# Patient Record
Sex: MALE | HISPANIC OR LATINO | Employment: FULL TIME | URBAN - METROPOLITAN AREA
[De-identification: names, ages, dates, MRNs, and addresses within clinical notes are randomized per-mention and may not be internally consistent; named-entity substitution may affect disease eponyms.]

---

## 2017-09-05 ENCOUNTER — APPOINTMENT (OUTPATIENT)
Dept: RADIOLOGY | Facility: MEDICAL CENTER | Age: 32
End: 2017-09-05
Attending: EMERGENCY MEDICINE
Payer: MEDICAID

## 2017-09-05 ENCOUNTER — HOSPITAL ENCOUNTER (EMERGENCY)
Facility: MEDICAL CENTER | Age: 32
End: 2017-09-05
Attending: EMERGENCY MEDICINE
Payer: MEDICAID

## 2017-09-05 VITALS
SYSTOLIC BLOOD PRESSURE: 120 MMHG | OXYGEN SATURATION: 98 % | HEART RATE: 71 BPM | HEIGHT: 64 IN | BODY MASS INDEX: 32.97 KG/M2 | WEIGHT: 193.12 LBS | DIASTOLIC BLOOD PRESSURE: 74 MMHG | RESPIRATION RATE: 16 BRPM | TEMPERATURE: 98.6 F

## 2017-09-05 DIAGNOSIS — S62.91XA HAND FRACTURE, RIGHT, CLOSED, INITIAL ENCOUNTER: ICD-10-CM

## 2017-09-05 DIAGNOSIS — F41.8 SITUATIONAL ANXIETY: ICD-10-CM

## 2017-09-05 PROCEDURE — 99284 EMERGENCY DEPT VISIT MOD MDM: CPT

## 2017-09-05 PROCEDURE — 302874 HCHG BANDAGE ACE 2 OR 3""

## 2017-09-05 PROCEDURE — 29125 APPL SHORT ARM SPLINT STATIC: CPT

## 2017-09-05 PROCEDURE — 73130 X-RAY EXAM OF HAND: CPT | Mod: RT

## 2017-09-05 RX ORDER — SODIUM CHLORIDE 9 MG/ML
30 INJECTION, SOLUTION INTRAVENOUS ONCE
Status: DISCONTINUED | OUTPATIENT
Start: 2017-09-05 | End: 2017-09-05

## 2017-09-05 RX ORDER — HYDROCODONE BITARTRATE AND ACETAMINOPHEN 5; 325 MG/1; MG/1
1-2 TABLET ORAL EVERY 6 HOURS PRN
Qty: 12 TAB | Refills: 0 | Status: SHIPPED | OUTPATIENT
Start: 2017-09-05

## 2017-09-05 ASSESSMENT — PAIN SCALES - GENERAL: PAINLEVEL_OUTOF10: 8

## 2019-12-24 ENCOUNTER — HOSPITAL ENCOUNTER (EMERGENCY)
Facility: MEDICAL CENTER | Age: 34
End: 2019-12-24
Attending: EMERGENCY MEDICINE
Payer: MEDICAID

## 2019-12-24 ENCOUNTER — APPOINTMENT (OUTPATIENT)
Dept: RADIOLOGY | Facility: MEDICAL CENTER | Age: 34
End: 2019-12-24
Attending: EMERGENCY MEDICINE
Payer: MEDICAID

## 2019-12-24 VITALS
DIASTOLIC BLOOD PRESSURE: 77 MMHG | TEMPERATURE: 99.5 F | HEART RATE: 77 BPM | HEIGHT: 64 IN | WEIGHT: 152.12 LBS | BODY MASS INDEX: 25.97 KG/M2 | SYSTOLIC BLOOD PRESSURE: 123 MMHG | OXYGEN SATURATION: 98 % | RESPIRATION RATE: 18 BRPM

## 2019-12-24 DIAGNOSIS — R07.89 COSTOCHONDRAL CHEST PAIN: ICD-10-CM

## 2019-12-24 DIAGNOSIS — F15.10 METHAMPHETAMINE ABUSE (HCC): ICD-10-CM

## 2019-12-24 LAB
ALBUMIN SERPL BCP-MCNC: 4.2 G/DL (ref 3.2–4.9)
ALBUMIN/GLOB SERPL: 1.6 G/DL
ALP SERPL-CCNC: 59 U/L (ref 30–99)
ALT SERPL-CCNC: 19 U/L (ref 2–50)
ANION GAP SERPL CALC-SCNC: 9 MMOL/L (ref 0–11.9)
AST SERPL-CCNC: 10 U/L (ref 12–45)
BASOPHILS # BLD AUTO: 0.7 % (ref 0–1.8)
BASOPHILS # BLD: 0.05 K/UL (ref 0–0.12)
BILIRUB SERPL-MCNC: 0.3 MG/DL (ref 0.1–1.5)
BUN SERPL-MCNC: 14 MG/DL (ref 8–22)
CALCIUM SERPL-MCNC: 8.8 MG/DL (ref 8.5–10.5)
CHLORIDE SERPL-SCNC: 103 MMOL/L (ref 96–112)
CO2 SERPL-SCNC: 25 MMOL/L (ref 20–33)
CREAT SERPL-MCNC: 0.95 MG/DL (ref 0.5–1.4)
EKG IMPRESSION: NORMAL
EOSINOPHIL # BLD AUTO: 0.15 K/UL (ref 0–0.51)
EOSINOPHIL NFR BLD: 2.2 % (ref 0–6.9)
ERYTHROCYTE [DISTWIDTH] IN BLOOD BY AUTOMATED COUNT: 39.1 FL (ref 35.9–50)
GLOBULIN SER CALC-MCNC: 2.7 G/DL (ref 1.9–3.5)
GLUCOSE SERPL-MCNC: 126 MG/DL (ref 65–99)
HCT VFR BLD AUTO: 42.9 % (ref 42–52)
HGB BLD-MCNC: 14.6 G/DL (ref 14–18)
IMM GRANULOCYTES # BLD AUTO: 0.03 K/UL (ref 0–0.11)
IMM GRANULOCYTES NFR BLD AUTO: 0.4 % (ref 0–0.9)
LYMPHOCYTES # BLD AUTO: 2.08 K/UL (ref 1–4.8)
LYMPHOCYTES NFR BLD: 30.6 % (ref 22–41)
MCH RBC QN AUTO: 30.5 PG (ref 27–33)
MCHC RBC AUTO-ENTMCNC: 34 G/DL (ref 33.7–35.3)
MCV RBC AUTO: 89.7 FL (ref 81.4–97.8)
MONOCYTES # BLD AUTO: 0.69 K/UL (ref 0–0.85)
MONOCYTES NFR BLD AUTO: 10.2 % (ref 0–13.4)
NEUTROPHILS # BLD AUTO: 3.79 K/UL (ref 1.82–7.42)
NEUTROPHILS NFR BLD: 55.9 % (ref 44–72)
NRBC # BLD AUTO: 0 K/UL
NRBC BLD-RTO: 0 /100 WBC
PLATELET # BLD AUTO: 236 K/UL (ref 164–446)
PMV BLD AUTO: 9.9 FL (ref 9–12.9)
POTASSIUM SERPL-SCNC: 4.1 MMOL/L (ref 3.6–5.5)
PROT SERPL-MCNC: 6.9 G/DL (ref 6–8.2)
RBC # BLD AUTO: 4.78 M/UL (ref 4.7–6.1)
SODIUM SERPL-SCNC: 137 MMOL/L (ref 135–145)
TROPONIN T SERPL-MCNC: <6 NG/L (ref 6–19)
WBC # BLD AUTO: 6.8 K/UL (ref 4.8–10.8)

## 2019-12-24 PROCEDURE — 700111 HCHG RX REV CODE 636 W/ 250 OVERRIDE (IP): Performed by: EMERGENCY MEDICINE

## 2019-12-24 PROCEDURE — 93005 ELECTROCARDIOGRAM TRACING: CPT | Performed by: EMERGENCY MEDICINE

## 2019-12-24 PROCEDURE — 85025 COMPLETE CBC W/AUTO DIFF WBC: CPT

## 2019-12-24 PROCEDURE — 96375 TX/PRO/DX INJ NEW DRUG ADDON: CPT

## 2019-12-24 PROCEDURE — 80053 COMPREHEN METABOLIC PANEL: CPT

## 2019-12-24 PROCEDURE — 93005 ELECTROCARDIOGRAM TRACING: CPT

## 2019-12-24 PROCEDURE — 96374 THER/PROPH/DIAG INJ IV PUSH: CPT

## 2019-12-24 PROCEDURE — 71045 X-RAY EXAM CHEST 1 VIEW: CPT

## 2019-12-24 PROCEDURE — 99285 EMERGENCY DEPT VISIT HI MDM: CPT

## 2019-12-24 PROCEDURE — 84484 ASSAY OF TROPONIN QUANT: CPT

## 2019-12-24 RX ORDER — KETOROLAC TROMETHAMINE 30 MG/ML
30 INJECTION, SOLUTION INTRAMUSCULAR; INTRAVENOUS ONCE
Status: COMPLETED | OUTPATIENT
Start: 2019-12-24 | End: 2019-12-24

## 2019-12-24 RX ORDER — LORAZEPAM 2 MG/ML
0.5 INJECTION INTRAMUSCULAR ONCE
Status: COMPLETED | OUTPATIENT
Start: 2019-12-24 | End: 2019-12-24

## 2019-12-24 RX ADMIN — LORAZEPAM 0.5 MG: 2 INJECTION INTRAMUSCULAR; INTRAVENOUS at 08:18

## 2019-12-24 RX ADMIN — KETOROLAC TROMETHAMINE 30 MG: 30 INJECTION, SOLUTION INTRAMUSCULAR at 08:16

## 2019-12-24 ASSESSMENT — ENCOUNTER SYMPTOMS
SHORTNESS OF BREATH: 0
DIZZINESS: 0
NECK PAIN: 0
PALPITATIONS: 0
VOMITING: 0
TINGLING: 0
FEVER: 0
BACK PAIN: 0
HEADACHES: 0
ABDOMINAL PAIN: 0

## 2019-12-24 ASSESSMENT — LIFESTYLE VARIABLES
CONSUMPTION TOTAL: INCOMPLETE
HAVE YOU EVER FELT YOU SHOULD CUT DOWN ON YOUR DRINKING: NO
EVER HAD A DRINK FIRST THING IN THE MORNING TO STEADY YOUR NERVES TO GET RID OF A HANGOVER: NO
TOTAL SCORE: 0
TOTAL SCORE: 0
EVER FELT BAD OR GUILTY ABOUT YOUR DRINKING: NO
DO YOU DRINK ALCOHOL: NO
TOTAL SCORE: 0
HAVE PEOPLE ANNOYED YOU BY CRITICIZING YOUR DRINKING: NO

## 2019-12-24 NOTE — DISCHARGE PLANNING
MSW spoke to CPS supervisor Mary. Pt's son and grandmother have left bedside. MSW updated Mary and provided contact information for pt's son. Pt's grandmother's name is Zahida. Mary stated they will follow-up at the home of pt for check on pt's son. They will not be responding to the ER at this time. MSW updated Alert team Vijaya.

## 2019-12-24 NOTE — ED NOTES
"Patient endorses using \" a crap ton of meth about 4 hours ago and I feel like something is going to happen. Where's the doctor I need the doctor now\". Patient son is room about 14 years old. Social Work called to assist. Patient placed on monitor VSS   "

## 2019-12-24 NOTE — ED NOTES
Patient removed cardiac monitor, pulse and bp cuff and came out to nurses station. Patient reassure his VS are good, ekg is good. Patient escorted back to his room and rehooked up

## 2019-12-24 NOTE — DISCHARGE PLANNING
Alert Team:    Spoke with patient at bedside with family present. Provided patient with resource list for inpatient rehab and establishing with psychiatry. Patient reports hx of bipolar, not currently treated. Conferred with RN  Regarding SW note regarding CPS. Bedside RN aware.

## 2019-12-24 NOTE — ED PROVIDER NOTES
ED Provider Note    ED Provider Note    Primary care provider: Pcp Pt States None  Means of arrival: POV  History obtained from: Patient, mother  History limited by: None    CHIEF COMPLAINT  Chief Complaint   Patient presents with   • Chest Pain       HPI  Curly Leigh is a 34 y.o. male who presents to the Emergency Department with his mother and younger brother with a chief complaint of left-sided chest wall pain that started at midnight.  Patient's had this multiple times in the past.  He states that each time, it has been in the setting of using methamphetamines.  He does frequently use methamphetamines.  His mom also states that he is borderline schizo affective.  He struggled with methamphetamine use and abuse since he was a teenager.  He denies any shortness of breath.  No cough or congestion.  No fever.  He denies any shoulder jaw or neck pain.  He feels as though there is muscle spasms in his pectoralis muscle.  He has been tolerating a regular diet.  No nausea or vomiting.    REVIEW OF SYSTEMS  Review of Systems   Constitutional: Negative for fever.   HENT: Negative for congestion.    Respiratory: Negative for shortness of breath.    Cardiovascular: Positive for chest pain. Negative for palpitations and leg swelling.   Gastrointestinal: Negative for abdominal pain and vomiting.   Genitourinary: Negative for dysuria.   Musculoskeletal: Negative for back pain and neck pain.   Neurological: Negative for dizziness, tingling and headaches.   All other systems reviewed and are negative.      PAST MEDICAL HISTORY   has a past medical history of Bipolar affective (HCC) and Depression.    SURGICAL HISTORY  patient denies any surgical history    SOCIAL HISTORY  Social History     Tobacco Use   • Smoking status: Current Every Day Smoker     Packs/day: 0.50     Years: 14.00     Pack years: 7.00   • Smokeless tobacco: Never Used   Substance Use Topics   • Alcohol use: Yes     Comment: occasional    • Drug use:  "Yes     Comment: METH      Social History     Substance and Sexual Activity   Drug Use Yes    Comment: METH       FAMILY HISTORY  History reviewed. No pertinent family history.    CURRENT MEDICATIONS  Home Medications    **Home medications have not yet been reviewed for this encounter**         ALLERGIES  No Known Allergies    PHYSICAL EXAM  VITAL SIGNS: /77   Pulse 77   Temp 37.5 °C (99.5 °F)   Resp 18   Ht 1.626 m (5' 4\")   Wt 69 kg (152 lb 1.9 oz)   SpO2 98%   BMI 26.11 kg/m²   Vitals reviewed.  Constitutional: Patient is oriented to person, place, and time. Appears well-developed and well-nourished.  Mild distress.    Head: Normocephalic and atraumatic.   Ears: Normal external ears bilaterally.   Mouth/Throat: Oropharynx is clear and moist  Eyes: Conjunctivae are normal. Pupils are equal, round, and reactive to light.   Neck: Normal range of motion. Neck supple.  Cardiovascular: Normal rate, regular rhythm and normal heart sounds. Normal peripheral pulses.  Pulmonary/Chest: Effort normal and breath sounds normal. No respiratory distress, no wheezes, rhonchi, or rales. Left upper anterior chest wall tenderness, along the sternal costal margin.  Abdominal: Soft. Bowel sounds are normal. There is no tenderness. No rebound or guarding, or peritoneal signs. No CVA tenderness.  Musculoskeletal: No edema and no tenderness.   Lymphadenopathy: No cervical adenopathy.   Neurological: No focal deficits.   Skin: Skin is warm and dry. No erythema. No pallor.   Psychiatric: Patient has a normal mood and affect.     LABS  Results for orders placed or performed during the hospital encounter of 12/24/19   CBC with Differential   Result Value Ref Range    WBC 6.8 4.8 - 10.8 K/uL    RBC 4.78 4.70 - 6.10 M/uL    Hemoglobin 14.6 14.0 - 18.0 g/dL    Hematocrit 42.9 42.0 - 52.0 %    MCV 89.7 81.4 - 97.8 fL    MCH 30.5 27.0 - 33.0 pg    MCHC 34.0 33.7 - 35.3 g/dL    RDW 39.1 35.9 - 50.0 fL    Platelet Count 236 164 - 446 " K/uL    MPV 9.9 9.0 - 12.9 fL    Neutrophils-Polys 55.90 44.00 - 72.00 %    Lymphocytes 30.60 22.00 - 41.00 %    Monocytes 10.20 0.00 - 13.40 %    Eosinophils 2.20 0.00 - 6.90 %    Basophils 0.70 0.00 - 1.80 %    Immature Granulocytes 0.40 0.00 - 0.90 %    Nucleated RBC 0.00 /100 WBC    Neutrophils (Absolute) 3.79 1.82 - 7.42 K/uL    Lymphs (Absolute) 2.08 1.00 - 4.80 K/uL    Monos (Absolute) 0.69 0.00 - 0.85 K/uL    Eos (Absolute) 0.15 0.00 - 0.51 K/uL    Baso (Absolute) 0.05 0.00 - 0.12 K/uL    Immature Granulocytes (abs) 0.03 0.00 - 0.11 K/uL    NRBC (Absolute) 0.00 K/uL   Complete Metabolic Panel (CMP)   Result Value Ref Range    Sodium 137 135 - 145 mmol/L    Potassium 4.1 3.6 - 5.5 mmol/L    Chloride 103 96 - 112 mmol/L    Co2 25 20 - 33 mmol/L    Anion Gap 9.0 0.0 - 11.9    Glucose 126 (H) 65 - 99 mg/dL    Bun 14 8 - 22 mg/dL    Creatinine 0.95 0.50 - 1.40 mg/dL    Calcium 8.8 8.5 - 10.5 mg/dL    AST(SGOT) 10 (L) 12 - 45 U/L    ALT(SGPT) 19 2 - 50 U/L    Alkaline Phosphatase 59 30 - 99 U/L    Total Bilirubin 0.3 0.1 - 1.5 mg/dL    Albumin 4.2 3.2 - 4.9 g/dL    Total Protein 6.9 6.0 - 8.2 g/dL    Globulin 2.7 1.9 - 3.5 g/dL    A-G Ratio 1.6 g/dL   Troponin   Result Value Ref Range    Troponin T <6 6 - 19 ng/L   ESTIMATED GFR   Result Value Ref Range    GFR If African American >60 >60 mL/min/1.73 m 2    GFR If Non African American >60 >60 mL/min/1.73 m 2   EKG (NOW)   Result Value Ref Range    Report       [facility] Emergency Dept.    Test Date:  2019  Pt Name:    TAQUERIA CHOPRA         Department: ER  MRN:        [id number]                      Room:  Gender:     Male                         Technician: [id number]  :        1985                   Requested By:ER TRIAGE PROTOCOL  Order #:    [id number]                    Reading MD:    Measurements  Intervals                                Axis  Rate:       83                           P:          54  SD:         144                           QRS:        38  QRSD:       94                           T:          24  QT:         388  QTc:        456    Interpretive Statements  SINUS RHYTHM  RSR' IN V1 OR V2, RIGHT VCD OR RVH  No previous ECG available for comparison         All labs reviewed by me.    EKG Interpretation  Interpreted by me    Rhythm: normal sinus   Rate: 83  Axis: normal  Ectopy: none  Conduction: RSR prime in V1 and V2.  ST Segments: no acute change  T Waves: no acute change  Q Waves: none    Clinical Impression: no acute changes and normal EKG    RADIOLOGY  DX-CHEST-PORTABLE (1 VIEW)   Final Result      No acute cardiopulmonary abnormality.        The radiologist's interpretation of all radiological studies have been reviewed by me.    COURSE & MEDICAL DECISION MAKING  Pertinent Labs & Imaging studies reviewed. (See chart for details)    Obtained and reviewed past medical records.  Patient's last encounter was in 2017 in the emergency department he was seen for hand injury.    7:22 AM - Patient seen and examined at bedside.  This is a pleasant, albeit anxious, 34-year-old male who struggled for many years with methamphetamine abuse.  He presents with left-sided chest wall pain really.  This is a reproducible, localized pain.  Of explained to him, I doubt that it is ACS.  An IV has been established, labs drawn per nursing protocols.  And results show normal white blood cell count.  Normal H&H normal platelet count and no neutrophilic shift.  Chemistry shows an elevated glucose but was otherwise quite normal.  AST was slightly low at 10.  Troponin is less than 6.  This is after more than 6 hours of pain.  Awaiting chest x-ray.  Patient be treated with Toradol, low-dose of Ativan.    9:45 AM patient's reevaluated the bedside he is feeling markedly better.  We discussed lab results as well as chest x-ray findings, which were overall unrevealing.  He has a normal white blood cell count.  No neutrophilic shift.  Normal H&H and  platelet count.  Chemistry shows an elevated glucose of 126 and AST at low, 10 but otherwise normal with a normal troponin.  At this time, given that he has reassuring labs and a reassuring EKG after hours of pain, I suspect that this is musculoskeletal and likely costochondritis.  He is advised on taking ibuprofen for this.  He is requesting resources that he can explore in this community for rehab for methamphetamines.  I talked to Vijaya from the alert team who will come by and see him and provide him these resources.  After this, anticipate discharged home.  Patient is in stable condition and is feeling better.    Patient's discharged home in stable condition with strict return precautions.    FINAL IMPRESSION  1. Costochondral chest pain    2. Methamphetamine abuse (HCC)

## 2019-12-24 NOTE — DISCHARGE PLANNING
"Medical Social Work    Referral: Assessment/CPS Contact    Intervention: MSW received a call from bedside RN that pt admits to meth use and has his son with him.  MSW met with pt and pt's 14 year old son at bedside.  Pt would barely answer questions for this worker.  Pt's son, Moody Leigh is 14 years old (: 2005).  Pt states that he used meth 4 hours ago.  When asked how often he uses meth he states \"every second of the day\" but when questioned about use around his son he states \"it's not like that\".  Pt verified that his emergency contact is his wife, Tati Leigh and reports \"she's at home\".  Pt and pt's son are not very forthcoming with information when asked.  MSW contacted Karen with CPS to notify of the situation.  Karen states that she will review their records to see if they have history with the family and staff with her supervisor and call this worker back.    Plan: SW will follow.  "

## 2019-12-24 NOTE — ED TRIAGE NOTES
Chief Complaint   Patient presents with   • Chest Pain     Pt ambulatory to triage reporting chest pain since last night after using meth. Pt states pain is sharp but may be muscular. Pt appears anxious in triage. EKG done in triage.

## 2019-12-24 NOTE — DISCHARGE PLANNING
Medical Social Work    MSW received a return phone call from Karen with CPS who states that a worker will be by.  Kraen was updated that pt's mother (child's grandmother) is now at bedside.  ALEXANDRU gave report to ABHIJEET Brito.  CPS requested that pt not be D/C'd until they arrive to assess.  Bedside RN updated.

## 2019-12-24 NOTE — ED NOTES
PT amb up to triage desk stating 'where is the Dr. I need to see the Dr.' I explained the triage process, and asked the him to sit in the lobby in order for us to take him back to a . Pt then walked out the front lobby doors. Will monitor for a room.

## 2021-08-24 NOTE — ED NOTES
Report received from Adriana ALLISON pt care assumed. Pt has no needs at this time. VSS    Second referral received for History of adenomatous polyp of colon.

## 2023-07-02 ENCOUNTER — APPOINTMENT (OUTPATIENT)
Dept: RADIOLOGY | Facility: MEDICAL CENTER | Age: 38
End: 2023-07-02
Attending: EMERGENCY MEDICINE

## 2023-07-02 ENCOUNTER — HOSPITAL ENCOUNTER (EMERGENCY)
Facility: MEDICAL CENTER | Age: 38
End: 2023-07-02
Attending: EMERGENCY MEDICINE

## 2023-07-02 ENCOUNTER — HOSPITAL ENCOUNTER (EMERGENCY)
Dept: RADIOLOGY | Facility: MEDICAL CENTER | Age: 38
End: 2023-07-02
Attending: EMERGENCY MEDICINE

## 2023-07-02 VITALS
DIASTOLIC BLOOD PRESSURE: 84 MMHG | BODY MASS INDEX: 30.73 KG/M2 | SYSTOLIC BLOOD PRESSURE: 142 MMHG | WEIGHT: 180 LBS | RESPIRATION RATE: 16 BRPM | TEMPERATURE: 97.8 F | OXYGEN SATURATION: 97 % | HEIGHT: 64 IN | HEART RATE: 69 BPM

## 2023-07-02 DIAGNOSIS — M54.50 LUMBAR BACK PAIN: ICD-10-CM

## 2023-07-02 DIAGNOSIS — M54.6 ACUTE LEFT-SIDED THORACIC BACK PAIN: ICD-10-CM
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
## 2023-07-02 PROCEDURE — 72128 CT CHEST SPINE W/O DYE: CPT

## 2023-07-02 PROCEDURE — 72131 CT LUMBAR SPINE W/O DYE: CPT

## 2023-07-02 PROCEDURE — 700102 HCHG RX REV CODE 250 W/ 637 OVERRIDE(OP): Mod: UD | Performed by: EMERGENCY MEDICINE

## 2023-07-02 PROCEDURE — 99284 EMERGENCY DEPT VISIT MOD MDM: CPT

## 2023-07-02 PROCEDURE — A9270 NON-COVERED ITEM OR SERVICE: HCPCS | Mod: UD | Performed by: EMERGENCY MEDICINE

## 2023-07-02 RX ORDER — CYCLOBENZAPRINE HCL 5 MG
5-10 TABLET ORAL 3 TIMES DAILY PRN
Qty: 30 TABLET | Refills: 0 | Status: SHIPPED | OUTPATIENT
Start: 2023-07-02

## 2023-07-02 RX ORDER — OXYCODONE HYDROCHLORIDE AND ACETAMINOPHEN 5; 325 MG/1; MG/1
2 TABLET ORAL ONCE
Status: COMPLETED | OUTPATIENT
Start: 2023-07-02 | End: 2023-07-02

## 2023-07-02 RX ORDER — IBUPROFEN 600 MG/1
600 TABLET ORAL ONCE
Status: COMPLETED | OUTPATIENT
Start: 2023-07-02 | End: 2023-07-02

## 2023-07-02 RX ORDER — KETOROLAC TROMETHAMINE 30 MG/ML
15 INJECTION, SOLUTION INTRAMUSCULAR; INTRAVENOUS ONCE
Status: DISCONTINUED | OUTPATIENT
Start: 2023-07-02 | End: 2023-07-02 | Stop reason: HOSPADM

## 2023-07-02 RX ORDER — CYCLOBENZAPRINE HCL 10 MG
10 TABLET ORAL
Status: COMPLETED | OUTPATIENT
Start: 2023-07-02 | End: 2023-07-02

## 2023-07-02 RX ADMIN — IBUPROFEN 600 MG: 600 TABLET, FILM COATED ORAL at 10:11

## 2023-07-02 RX ADMIN — CYCLOBENZAPRINE 10 MG: 10 TABLET, FILM COATED ORAL at 10:11

## 2023-07-02 RX ADMIN — OXYCODONE HYDROCHLORIDE AND ACETAMINOPHEN 2 TABLET: 5; 325 TABLET ORAL at 11:27

## 2023-07-02 ASSESSMENT — PAIN DESCRIPTION - PAIN TYPE: TYPE: ACUTE PAIN

## 2023-07-02 NOTE — ED NOTES
Pt wheeled to B21.  Pt into gown, attached to monitor.  Girlfriend at bedside.  Pt A+Ox4, hypertensive on RA.  Call light within reach.  No acute distress at this time.

## 2023-07-02 NOTE — ED PROVIDER NOTES
"  ER Provider Note    Scribed for Javier Castro MD by Fallon Zamora. 7/2/2023  9:20 AM    Primary Care Provider: Pcp Pt States None    CHIEF COMPLAINT  Chief Complaint   Patient presents with    Fall     [facility], hit back of head +loc    Back Pain         HPI/ROS  LIMITATION TO HISTORY   Select: : None  OUTSIDE HISTORIAN(S):  Family who is present at bedside    Curly Leigh is a 38 y.o. male who presents to the ED complaining of a fall onset 2 days ago. Patient states when he fell he hit his head and lost consciousness for about 10 seconds. Denies any seizure-like of activity. Patient has associated midline back pain which has worsened over the last 2 days. Patient has associated weakness which is exacerbated when he stands up and lays flat. He describes the pain as \"stabbing\". He reports an associated headache, nausea, difficulty breathing. He denies any difficulty walking, bruising urinary incontinence, urinary retention, bowel incontinence, vomiting. Patient denies any medication.     PAST MEDICAL HISTORY  Past Medical History:   Diagnosis Date    Bipolar affective (HCC)     Depression      SURGICAL HISTORY  No pertinent past surgical history.    FAMILY HISTORY  No pertinent family history.    SOCIAL HISTORY   reports that he has been smoking. He has a 7.00 pack-year smoking history. He has never used smokeless tobacco. He reports current alcohol use. He reports current drug use.    CURRENT MEDICATIONS  Discharge Medication List as of 7/2/2023 11:17 AM        CONTINUE these medications which have NOT CHANGED    Details   hydrocodone-acetaminophen (NORCO) 5-325 MG Tab per tablet Take 1-2 Tabs by mouth every 6 hours as needed., Disp-12 Tab, R-0, Print Rx Paper           ALLERGIES  Patient has no known allergies.    PHYSICAL EXAM  BP (!) 140/98   Pulse (!) 102   Temp 37.4 °C (99.4 °F) (Temporal)   Resp 18   Ht 1.626 m (5' 4\")   Wt 81.6 kg (180 lb)   SpO2 96%   BMI 30.90 " kg/m²     Constitutional: Alert in no apparent distress.  HENT: No signs of trauma, Bilateral external ears normal, Nose normal. Uvula midline.   Eyes: Pupils are equal and reactive, Conjunctiva normal, Non-icteric.   Neck: Normal range of motion, No tenderness, Supple, No stridor.   Lymphatic: No lymphadenopathy noted.   Cardiovascular: Regular rate and rhythm, no murmurs.   Thorax & Lungs: Normal breath sounds, No respiratory distress, No wheezing, No chest tenderness.   Abdomen: Bowel sounds normal, Soft, No tenderness, No peritoneal signs, No masses, No pulsatile masses.   Skin: Warm, Dry, No erythema, No rash.   Back: No bony tenderness, No CVA tenderness.   Extremities: Intact distal pulses, No edema, No tenderness, No cyanosis.  Musculoskeletal: Good range of motion in all major joints. No tenderness to palpation or major deformities noted.   Neurologic: Alert , Normal motor function, Normal sensory function, No focal deficits noted.   Psychiatric: Affect normal, Judgment normal, Mood normal.      DIAGNOSTIC STUDIES  Radiology:   The attending emergency physician has independently interpreted the diagnostic imaging associated with this visit and am waiting the final reading from the radiologist.   Preliminary interpretation is a follows: no fx  Radiologist interpretation:   CT-LSPINE W/O PLUS RECONS   Final Result      No acute fracture or dislocation of the lumbar spine.      CT-TSPINE W/O PLUS RECONS   Final Result      No acute fracture or dislocation of the thoracic spine        COURSE & MEDICAL DECISION MAKING     ED Observation Status? No; Patient does not meet criteria for ED Observation.     INITIAL ASSESSMENT, COURSE AND PLAN  Care Narrative:   9:20 AM - Patient is a 38 year old male who presents to ED after a fall onset 2 days ago. Patient was first seen and evaluated at bedside.   I discussed with the patient the plan of care, which includes treating the patient with medication for their symptoms,  as well as obtaining lab work and imaging for further evaluation. Patient understands and verbalizes agreement to plan of care.   Patient will be treated with Motrin 600 mg, Toradol 15 mg, and Flexeril 10 mg. Ordered CT-Tspine w/o plus recons, CT-Lspine w/o plus recons and DX-Chest.   Differentials include but are not limited to:   No weakness or numbness to suggest cauda equina syndrome.  No IVDU/fever. No history of cancer/unintentional weight loss. No bowel/bladder dysfunction. No numbness/weakness/saddle anesthesia.  Doubt infectious etiology given no fever  Given trauma CT T-spine and L-spine were ordered  No evidence of fracture noted on CT scans  No e/o rash to suggest zoster  Pain likely represents MSK pain.  Pt given below meds for sx relief in ED  Pt agrees to f/u w/ PCP or physical therapist if pain persists for greater than 1 wk.  Pt instructed to return to ED if pain continues to persist or worsens.      11:09 AM - Patient denies getting DX-Chest performed at this time.     11:18 AM - Patient was reevaluated at bedside. Patient was medicated with Percocet 5-325 mg and feels alleviated. Discussed lab and radiology results with the patient and informed them of his reassuring results. I then informed the patient of my plan for discharge, which includes strict return precautions for any new or worsening symptoms. Patient understands and verbalizes agreement to plan of care. Patient is comfortable going home at this time.         I encourage patient to follow-up with primary care physician and to consider physical therapy and if symptoms do not improve MRI  Patient referred to Dr. Perez for further pain management  I encourage patient    HTN/IDDM FOLLOW UP:  The patient is referred to a primary physician for blood pressure management, diabetic screening, and for all other preventive health concerns    DISPOSITION AND DISCUSSIONS  I have discussed management of the patient with the following physicians and  AMANDA's:  None    Discussion of management with other QHP or appropriate source(s): None     Escalation of care considered, and ultimately not performed: acute inpatient care management, however at this time, the patient is most appropriate for outpatient management.    Barriers to care at this time, including but not limited to: Patient does not have established PCP.     Decision tools and prescription drugs considered including, but not limited to:  None .  Patient will be discharged home.    FOLLOW UP:  [facility], Emergency Dept  [address] [id number] [phone]    If symptoms worsen    [place] [address] [facility] [address]  [phone]  In 3 days  If symptoms worsen    Marietta Koehler M.D.  [address] [facility] [id number] [phone]        FINAL DIAGNOSIS  1. Fall, initial encounter    2. Acute left-sided thoracic back pain    3. Lumbar back pain      The note accurately reflects work and decisions made by me.  Javier Castro M.D.  7/2/2023  4:14 PM

## 2023-07-02 NOTE — ED TRIAGE NOTES
Chief Complaint   Patient presents with    Fall     Slipped and fell at [facility] yesterday, hit back of head +loc    Back Pain     Pt wheeled to triage with above complaints, pt slipped and fell at [facility] yesterday. He said he was able to walk after but with pain.

## 2023-07-02 NOTE — DISCHARGE INSTRUCTIONS
Please discuss the following findings with your regular doctor:  CT-LSPINE W/O PLUS RECONS   Final Result      No acute fracture or dislocation of the lumbar spine.      CT-TSPINE W/O PLUS RECONS   Final Result      No acute fracture or dislocation of the thoracic spine      DX-CHEST-2 VIEWS    (Results Pending)     Labs Reviewed - No data to display

## 2023-07-02 NOTE — ED NOTES
"Pt discharged to home with steady gait.  Pt alert and oriented times 4 on room air.  Pt in possession of belongings.  Pt provided discharge education and information pertaining to medications and follow up appointments.  Pt received copy of discharge instructions and verbalized understanding. Encouraged to follow up with PCP. BP (!) 142/84   Pulse 69   Temp 36.6 °C (97.8 °F) (Temporal)   Resp 16   Ht 1.626 m (5' 4\")   Wt 81.6 kg (180 lb)   SpO2 97%   BMI 30.90 kg/m²      "

## 2023-11-02 ENCOUNTER — APPOINTMENT (OUTPATIENT)
Dept: URGENT CARE | Facility: CLINIC | Age: 38
End: 2023-11-02

## 2024-01-01 ENCOUNTER — APPOINTMENT (OUTPATIENT)
Dept: URGENT CARE | Facility: CLINIC | Age: 39
End: 2024-01-01